# Patient Record
Sex: FEMALE | Race: OTHER | HISPANIC OR LATINO | URBAN - METROPOLITAN AREA
[De-identification: names, ages, dates, MRNs, and addresses within clinical notes are randomized per-mention and may not be internally consistent; named-entity substitution may affect disease eponyms.]

---

## 2018-02-15 PROBLEM — Z00.00 ENCOUNTER FOR PREVENTIVE HEALTH EXAMINATION: Status: ACTIVE | Noted: 2018-02-15

## 2018-04-13 ENCOUNTER — OUTPATIENT (OUTPATIENT)
Dept: OUTPATIENT SERVICES | Facility: HOSPITAL | Age: 43
LOS: 1 days | End: 2018-04-13
Payer: COMMERCIAL

## 2018-04-13 ENCOUNTER — APPOINTMENT (OUTPATIENT)
Dept: MAMMOGRAPHY | Facility: HOSPITAL | Age: 43
End: 2018-04-13

## 2018-04-13 PROCEDURE — 77067 SCR MAMMO BI INCL CAD: CPT | Mod: 26

## 2018-04-13 PROCEDURE — 77063 BREAST TOMOSYNTHESIS BI: CPT

## 2018-04-13 PROCEDURE — 77063 BREAST TOMOSYNTHESIS BI: CPT | Mod: 26

## 2018-04-13 PROCEDURE — 77067 SCR MAMMO BI INCL CAD: CPT

## 2018-04-20 ENCOUNTER — OUTPATIENT (OUTPATIENT)
Dept: OUTPATIENT SERVICES | Facility: HOSPITAL | Age: 43
LOS: 1 days | End: 2018-04-20
Payer: COMMERCIAL

## 2018-04-20 ENCOUNTER — APPOINTMENT (OUTPATIENT)
Dept: ULTRASOUND IMAGING | Facility: HOSPITAL | Age: 43
End: 2018-04-20
Payer: COMMERCIAL

## 2018-04-20 PROCEDURE — 76641 ULTRASOUND BREAST COMPLETE: CPT

## 2018-04-20 PROCEDURE — 76641 ULTRASOUND BREAST COMPLETE: CPT | Mod: 26,50

## 2018-04-25 ENCOUNTER — RESULT REVIEW (OUTPATIENT)
Age: 43
End: 2018-04-25

## 2018-04-25 ENCOUNTER — APPOINTMENT (OUTPATIENT)
Dept: ULTRASOUND IMAGING | Facility: HOSPITAL | Age: 43
End: 2018-04-25
Payer: COMMERCIAL

## 2018-04-25 ENCOUNTER — OUTPATIENT (OUTPATIENT)
Dept: OUTPATIENT SERVICES | Facility: HOSPITAL | Age: 43
LOS: 1 days | End: 2018-04-25
Payer: COMMERCIAL

## 2018-04-25 PROCEDURE — 77065 DX MAMMO INCL CAD UNI: CPT | Mod: 26,RT

## 2018-04-25 PROCEDURE — 88305 TISSUE EXAM BY PATHOLOGIST: CPT

## 2018-04-25 PROCEDURE — 19083 BX BREAST 1ST LESION US IMAG: CPT

## 2018-04-25 PROCEDURE — 77065 DX MAMMO INCL CAD UNI: CPT

## 2018-04-25 PROCEDURE — 19083 BX BREAST 1ST LESION US IMAG: CPT | Mod: RT

## 2018-04-25 PROCEDURE — A4648: CPT

## 2018-04-26 LAB — SURGICAL PATHOLOGY STUDY: SIGNIFICANT CHANGE UP

## 2020-01-02 ENCOUNTER — HOSPITAL ENCOUNTER (EMERGENCY)
Dept: HOSPITAL 74 - JERFT | Age: 45
Discharge: HOME | End: 2020-01-02
Payer: COMMERCIAL

## 2020-01-02 VITALS — SYSTOLIC BLOOD PRESSURE: 135 MMHG | HEART RATE: 74 BPM | DIASTOLIC BLOOD PRESSURE: 63 MMHG | TEMPERATURE: 98 F

## 2020-01-02 VITALS — BODY MASS INDEX: 31.2 KG/M2
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
## 2020-01-02 DIAGNOSIS — S46.811A: Primary | ICD-10-CM
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
## 2020-01-02 PROCEDURE — 3E0233Z INTRODUCTION OF ANTI-INFLAMMATORY INTO MUSCLE, PERCUTANEOUS APPROACH: ICD-10-PCS

## 2020-01-02 NOTE — PDOC
Rapid Medical Evaluation


Chief Complaint: Pain, Acute


Time Seen by Provider: 01/02/20 13:20


Medical Evaluation: 





01/02/20 13:20


I have performed a brief in-person evaluation of this patient.





The patient presents with a chief complaint of: RT arm pain since yesterday w/o 

trauma or injury. Pt took advil this AM for pain. report pain started after 

doing house cleaning





Pertinent physical exam findings: mild tenderness over lateral deltoid muscle 

of RT upper arm. no shoulder or forearm tenderness.





I have ordered the following: Toradol 30mg IM, Robaxin 500mg PO





The patient will proceed to the ED for further evaluation.








01/02/20 13:22








**Discharge Disposition





- Diagnosis


 Right arm pain








- Discharge Dispostion


Condition at time of disposition: Stable





- Referrals





- Patient Instructions





- Post Discharge Activity

## 2020-01-02 NOTE — PDOC
History of Present Illness





- General


Chief Complaint: Pain, Acute


Stated Complaint: RT ARM PAIN


Time Seen by Provider: 01/02/20 13:20





- History of Present Illness


Initial Comments: 





01/02/20 14:32


CHIEF COMPLAINT: R shoulder pain





HISTORY OF PRESENT ILLNESS: 45 yo F with no PMH presents to fast track with R 

shoulder pain since yesterday.  Patient reports that she was cleaning her home 

when the pain began and has worsened since yesterday. Patient reports pain to R 

deltoid with any movement of her arm but has full flexion/extension of R elbow.

  





No recent travel or sick contacts. 





PAST MEDICAL HISTORY: Denies past medical history





FAMILY HISTORY: Denies





SOCIAL HISTORY: Denies tobacco, alcohol, illicit drug use. 





SURGICAL HISTORY: Denies





ALLERGIES: No known drug allergies





REVIEW OF SYSTEMS


General/Constitutional: Denies fever or chills. Denies weakness, weight change.





HEENT: Denies change in vision. Denies ear pain or discharge. Denies sore 

throat.





Cardiovascular: Denies chest pain or shortness of breath.





Respiratory: Denies cough, wheezing, or hemoptysis.





Gastrointestinal: Denies nausea, vomiting, diarrhea or constipation. Denies 

rectal bleeding.





Genitourinary: Denies dysuria, frequency, or change in urination.





Musculoskeletal: Pain to R shoulder, worse with movement. 





Skin and breasts: Denies rash or easy bruising.





Neurologic: Denies headache, vertigo, loss of consciousness, or loss of 

sensation.





Psychiatric: Denies depression or anxiety.








PHYSICAL EXAM


General Appearance: Well-appearing, appropriately dressed.  No apparent distress

, no intoxication.





HEENT: EOMI, PERRLA, normal ENT inspection, normal voice, TMs normal, pharynx 

normal.  No conjunctival pallor.  No photophobia, scleral icterus.





Neck: Supple.  Trachea midline. No tenderness, rigidity, carotid bruit, stridor

, lymphadenopathy, or thyromegaly. 





Respiratory/Chest: Lungs CTAB.  No shortness of breath, chest tenderness, 

respiratory distress, accessory muscle use. No crackles, rales, rhonchi, stridor

, wheezing, dullness





Cardiovascular: RRR. S1, S2.  No JVD, murmur, bradycardia, tachycardia.





Vascular Pulses: Dorsalis-Pedis (R): 2+, Dorsalis-Pedis (L): 2+





Gastrointestinal/Abdominal: Normal bowel sounds.  Abdomen soft, non-distended.  

No tenderness or rebound tenderness. No  organomegaly, pulsatile mass, guarding

, hernia, hepatomegaly, splenomegaly.





Lymphatic: No adenopathy, tenderness.





Musculoskeletal/Extremities: Tenderness to R deltoid.  Full passive ROM to 

shoulder, limited active ROM secondary to pain. Normal inspection. FROM of all 

extremities, normal capillary refill.  Pelvis Stable.  No CVA tenderness. No 

tenderness to extremities, pedal edema, swelling, erythema or deformity.





Integumentary: Appropriate color, dry, warm.  No cyanosis, erythema, jaundice 

or rash





Neurologic: CNs II-XII intact. Fully oriented, alert.  Appropriate mood/affect. 

Motor strength 5/5.  No appreciable EOM palsy, facial droop or sensory deficit.





Past History





- Past Medical History


Allergies/Adverse Reactions: 


 Allergies











Allergy/AdvReac Type Severity Reaction Status Date / Time


 


No Known Allergies Allergy   Verified 01/02/20 13:21











Home Medications: 


Ambulatory Orders





Diclofenac Sodium 75 mg PO BID #20 tablet. 01/02/20 


Methocarbamol [Robaxin -] 500 mg PO TID #21 tablet 01/02/20 








COPD: No





- Psycho Social/Smoking Cessation Hx


Smoking History: Never smoked





*Physical Exam





- Vital Signs


 Last Vital Signs











Temp Pulse Resp BP Pulse Ox


 


 98 F   74   18   135/63   99 


 


 01/02/20 13:19  01/02/20 13:19  01/02/20 13:19  01/02/20 13:19  01/02/20 13:19














Medical Decision Making





- Medical Decision Making





01/02/20 14:39


 45 yo F with no PMH presents to fast track with R shoulder pain since 

yesterday. 





-Toradol


-Robaxin





Advised patient to take medication as prescribed and follow up with ortho if 

symptoms persist past 7-10 days.  Advised patient of signs and symptoms for 

return to ED.  Patient verbalized understanding and agrees to plan.





Discharge





- Discharge Information


Problems reviewed: Yes


Clinical Impression/Diagnosis: 


Muscle strain of upper arm


Qualifiers:


 Encounter type: initial encounter Laterality: right Qualified Code(s): 

S46.911A - Strain of unspecified muscle, fascia and tendon at shoulder and 

upper arm level, right arm, initial encounter





Condition: Stable


Disposition: HOME





- Admission


No





- Additional Discharge Information


Prescriptions: 


Diclofenac Sodium 75 mg PO BID #20 tablet.


Methocarbamol [Robaxin -] 500 mg PO TID #21 tablet





- Follow up/Referral


Referrals: 


Gordo Patel MD [Staff Physician] - 


Travon Perez DO [Staff Physician] - 





- Patient Discharge Instructions


Patient Printed Discharge Instructions:  DI for Shoulder Pain


Additional Instructions: 


Please take medication as prescribed.  As discussed, if your symptoms do not 

improve in 5-7 days, please follow up with an orthopedics for further 

evaluation and a possible MRI or physical therapy.  If you experience any loss 

of sensation to your extremities, any swelling or increased pain to your 

shoulder or arm, please return to the ER. 





- Post Discharge Activity

## 2020-09-04 ENCOUNTER — HOSPITAL ENCOUNTER (EMERGENCY)
Dept: HOSPITAL 74 - JERFT | Age: 45
Discharge: HOME | End: 2020-09-04
Payer: COMMERCIAL

## 2020-09-04 VITALS — HEART RATE: 85 BPM | DIASTOLIC BLOOD PRESSURE: 80 MMHG | SYSTOLIC BLOOD PRESSURE: 140 MMHG | TEMPERATURE: 98 F

## 2020-09-04 VITALS — BODY MASS INDEX: 33.7 KG/M2

## 2020-09-04 DIAGNOSIS — M79.604: Primary | ICD-10-CM

## 2020-09-04 PROCEDURE — 3E0233Z INTRODUCTION OF ANTI-INFLAMMATORY INTO MUSCLE, PERCUTANEOUS APPROACH: ICD-10-PCS

## 2020-09-04 NOTE — PDOC
History of Present Illness





- General


Chief Complaint: Pain


Stated Complaint: RT LEG PAIN


Time Seen by Provider: 09/04/20 22:10


History Source: Patient


Exam Limitations: No Limitations





- History of Present Illness


Initial Comments: 





09/04/20 22:15


45-year-old female no significant past medical history presented to the ED with 

right lower extremity pain.  Patient states that she believes she may have 

pulled a muscle and has experiencing right thigh pain worse with ambulation and 

bending.  Patient not taking medicine for the pain.  Pt otherwise denies: 

fevers, chills, syncope, lightheadedness, dizziness, headaches,  neck pain, 

chest pain, shortness of breath, palpitations, back pain, abdominal pain, 

nausea, vomiting, diarrhea, constipation.





Past History





- Medical History


Allergies/Adverse Reactions: 


                                    Allergies











Allergy/AdvReac Type Severity Reaction Status Date / Time


 


No Known Allergies Allergy   Verified 09/04/20 21:20











Home Medications: 


Ambulatory Orders





Diclofenac Sodium 75 mg PO BID #20 tablet. 01/02/20 


Methocarbamol [Robaxin -] 500 mg PO TID #21 tablet 01/02/20 


Cyclobenzaprine HCl [Flexeril 10 mg] 10 mg PO BID PRN #20 tablet 09/04/20 


Ibuprofen [Ibu] 600 mg PO TID 10 Days #30 tablet 09/04/20 








COPD: No


Other medical history: arthritis





- Reproductive History


Is Patient Pregnant Now?: No





- Psycho-Social/Smoking History


Smoking History: Never smoked





- Substance Abuse Hx (Audit-C & DAST Scrn)


How often the patient has a drink containing alcohol: Never


Score: In Men: 4 or > Positive; In Women: 3 or > Positive: 0


Screen Result (Pos requires Nsg. Audit-10AR): Negative





*Physical Exam





- Vital Signs


                                Last Vital Signs











Temp Pulse Resp BP Pulse Ox


 


 98 F   85   18   140/80   97 


 


 09/04/20 21:16  09/04/20 21:16  09/04/20 21:16  09/04/20 21:16  09/04/20 21:16














- Physical Exam





09/04/20 22:16


Gen: AAOx 3, no acute distress, comfortable, no signs of respiratory distress 


HENT: atraumatic, normocephalic with no laceration or contusion. Nasal mucosa 

without erythema. Oropharynx without erythema or exudates. Mucous membranes 

moist. 


EYES: PERRL, EOM intact, conjunctiva pink 


NECK: supple; trachea midline; no JVD, no lymphadenopathy, or thyromegaly


CV: RRR no murmurs, gallops, or rubs.


CHEST: CTA b/l no wheezing, rales or rhonchi


ABD: +BS/ND. no TTP; soft, no rebound, no guarding


EXTREMITY: no cyanosis or erythema. 2+ dorsalis pedis, posterior tibial, and 

radial pulse. No pedal edema; no calf swelling or tenderness 


SKIN: no rash, warm and dry, no diaphoresis 


HEME: no purpura or ecchymosis


NEURO: normal speech, CN II-XII intact, sensation intact no cerebellar deficits


MS: 5/5 strength in all extremities, FROM intact in all extremities except RLE


RLE: no swelling, ttp to quad and hamstring muscles with inc pain on hip and 

knee flexion and extension, 5/5 strength, sensation intact, ambulating with limp

 2/2 pain. 








Medical Decision Making





- Medical Decision Making





09/04/20 22:17


45-year-old female with right lower extremity pain


Vital signs stable





Pain reproducible on range of motion most likely musculoskeletal





We will administer Toradol and Flexeril in the emergency room for symptomatic 

relief


We will reassess 





Patient reports improvement in pain with meds in ED


Ibuprofen Flexeril sent to patient's preferred pharmacy


Patient was instructed not to drive or operate heavy machinery while taking 

Flexeril.  The patient was also instructed not to take the medication with any 

other sedating medications and not to drink alcohol while taking the medication.





Pt appears well and is safe and stable for discharge with strict return 

precautions including signs and symptoms requring immediate return to the ED





Supportive care instructions explained and given to pt.  Reasons to return 

emergently to ER explained and given. Importance of follow up with PMD and other

 specialists as indicated stressed to pt. Pt verbalized understanding of 

instructions.  Pt to follow up with PMD in 2 days.





Discharge





- Discharge Information


Problems reviewed: Yes


Clinical Impression/Diagnosis: 


 Right leg pain





Condition: Stable


Disposition: HOME





- Additional Discharge Information


Prescriptions: 


Cyclobenzaprine HCl [Flexeril 10 mg] 10 mg PO BID PRN #20 tablet


 PRN Reason: Moderate Pain


Ibuprofen [Ibu] 600 mg PO TID 10 Days #30 tablet





- Follow up/Referral


Referrals: 


ON STAFF,NOT [Primary Care Provider] - 





- Patient Discharge Instructions


Patient Printed Discharge Instructions:  DI for Muscle Strain


Additional Instructions: 


please follow up with your PCP


Print Language: Icelandic





- Post Discharge Activity

## 2021-01-17 ENCOUNTER — HOSPITAL ENCOUNTER (EMERGENCY)
Dept: HOSPITAL 74 - JVIRT | Age: 46
Discharge: HOME | End: 2021-01-17
Payer: COMMERCIAL

## 2021-01-17 DIAGNOSIS — Z20.822: Primary | ICD-10-CM

## 2021-01-17 PROCEDURE — U0003 INFECTIOUS AGENT DETECTION BY NUCLEIC ACID (DNA OR RNA); SEVERE ACUTE RESPIRATORY SYNDROME CORONAVIRUS 2 (SARS-COV-2) (CORONAVIRUS DISEASE [COVID-19]), AMPLIFIED PROBE TECHNIQUE, MAKING USE OF HIGH THROUGHPUT TECHNOLOGIES AS DESCRIBED BY CMS-2020-01-R: HCPCS

## 2021-01-17 PROCEDURE — C9803 HOPD COVID-19 SPEC COLLECT: HCPCS

## 2021-01-27 ENCOUNTER — HOSPITAL ENCOUNTER (EMERGENCY)
Dept: HOSPITAL 74 - JVIRT | Age: 46
Discharge: HOME | End: 2021-01-27
Payer: COMMERCIAL

## 2021-01-27 DIAGNOSIS — Z11.52: Primary | ICD-10-CM

## 2021-09-22 ENCOUNTER — APPOINTMENT (OUTPATIENT)
Dept: CT IMAGING | Facility: CLINIC | Age: 46
End: 2021-09-22

## 2021-09-25 ENCOUNTER — OUTPATIENT (OUTPATIENT)
Dept: OUTPATIENT SERVICES | Facility: HOSPITAL | Age: 46
LOS: 1 days | End: 2021-09-25

## 2021-09-25 ENCOUNTER — APPOINTMENT (OUTPATIENT)
Dept: CT IMAGING | Facility: CLINIC | Age: 46
End: 2021-09-25
Payer: COMMERCIAL

## 2021-09-25 PROCEDURE — 74176 CT ABD & PELVIS W/O CONTRAST: CPT | Mod: 26

## 2022-09-14 ENCOUNTER — APPOINTMENT (OUTPATIENT)
Dept: RADIOLOGY | Facility: CLINIC | Age: 47
End: 2022-09-14

## 2022-09-14 ENCOUNTER — OUTPATIENT (OUTPATIENT)
Dept: OUTPATIENT SERVICES | Facility: HOSPITAL | Age: 47
LOS: 1 days | End: 2022-09-14

## 2022-09-14 PROCEDURE — 71046 X-RAY EXAM CHEST 2 VIEWS: CPT | Mod: 26

## 2023-01-07 ENCOUNTER — APPOINTMENT (OUTPATIENT)
Dept: ULTRASOUND IMAGING | Facility: CLINIC | Age: 48
End: 2023-01-07
Payer: MEDICAID

## 2023-01-07 ENCOUNTER — OUTPATIENT (OUTPATIENT)
Dept: OUTPATIENT SERVICES | Facility: HOSPITAL | Age: 48
LOS: 1 days | End: 2023-01-07

## 2023-01-07 PROCEDURE — 76856 US EXAM PELVIC COMPLETE: CPT | Mod: 26

## 2023-01-07 PROCEDURE — 76830 TRANSVAGINAL US NON-OB: CPT | Mod: 26

## 2023-01-07 PROCEDURE — 76700 US EXAM ABDOM COMPLETE: CPT | Mod: 26
